# Patient Record
Sex: FEMALE | URBAN - METROPOLITAN AREA
[De-identification: names, ages, dates, MRNs, and addresses within clinical notes are randomized per-mention and may not be internally consistent; named-entity substitution may affect disease eponyms.]

---

## 2023-02-03 ENCOUNTER — OFFICE VISIT (OUTPATIENT)
Facility: LOCATION | Age: 63
End: 2023-02-03
Payer: COMMERCIAL

## 2023-02-03 DIAGNOSIS — H16.223 KERATOCONJUNCTIVITIS SICCA, BILATERAL: Primary | ICD-10-CM

## 2023-02-03 DIAGNOSIS — H31.092 OTHER CHORIORETINAL SCARS, LEFT EYE: ICD-10-CM

## 2023-02-03 DIAGNOSIS — Z79.899 OTHER LONG TERM (CURRENT) DRUG THERAPY: ICD-10-CM

## 2023-02-03 DIAGNOSIS — M35.01 SICCA SYNDROME WITH KERATOCONJUNCTIVITIS: ICD-10-CM

## 2023-02-03 DIAGNOSIS — H25.13 AGE-RELATED NUCLEAR CATARACT, BILATERAL: ICD-10-CM

## 2023-02-03 PROCEDURE — 99204 OFFICE O/P NEW MOD 45 MIN: CPT

## 2023-02-03 RX ORDER — LIFITEGRAST 50 MG/ML
5 % SOLUTION/ DROPS OPHTHALMIC
Qty: 180 | Refills: 3 | Status: ACTIVE
Start: 2023-02-03

## 2023-02-03 RX ORDER — LOTEPREDNOL ETABONATE 5 MG/ML
0.5 % SUSPENSION/ DROPS OPHTHALMIC
Qty: 0 | Refills: 0 | Status: ACTIVE
Start: 2023-02-03

## 2023-02-03 ASSESSMENT — KERATOMETRY
OS: 44.13
OD: 44.88

## 2023-02-03 ASSESSMENT — INTRAOCULAR PRESSURE
OS: 18
OD: 18

## 2023-02-03 NOTE — IMPRESSION/PLAN
Impression: Age-related nuclear cataract, bilateral: H25.13. Plan: Patient educated on condition. Cataract surgery not indicated at this time. Sunglasses advised. Notify clinic if activities of daily life are affected. Patient expressed understanding. Monitor annually; or sooner prn.

## 2023-02-03 NOTE — IMPRESSION/PLAN
Impression: Keratoconjunctivitis sicca, bilateral: S02.143. Hx of sjogrens AT not sufficient Pt believes she has tried restasis without relief Plan: Pt educated on the chronic nature of condition. ATs not sufficient for nature of condition. Started pt on steroid and cyclosporin. Educated that the cyclosporin can take weeks to months to reach full efficacy. Also recommended artificial tears at least QID OU. Instructed patient to notify clinic if no improvement in symptoms. Rx: 
loteprednol BID x4 weeks (sample of Alrex given to pt) Xiidra BID

RTC: 3-4 weeks for dry eye check or sooner; prn

## 2023-02-03 NOTE — IMPRESSION/PLAN
Impression: Other chorioretinal scars, left eye: H31.092. Plan: Pt educated on condition. Will monitor annually with [facility].

## 2023-02-03 NOTE — IMPRESSION/PLAN
Impression: Other long term (current) drug therapy: Z79.899. Plan: No toxicity seen with [facility]. RTC for mac OCT and 10-2VF.